# Patient Record
Sex: MALE | Race: WHITE | NOT HISPANIC OR LATINO | Employment: UNEMPLOYED | URBAN - METROPOLITAN AREA
[De-identification: names, ages, dates, MRNs, and addresses within clinical notes are randomized per-mention and may not be internally consistent; named-entity substitution may affect disease eponyms.]

---

## 2018-08-23 ENCOUNTER — HOSPITAL ENCOUNTER (EMERGENCY)
Facility: HOSPITAL | Age: 28
Discharge: HOME | End: 2018-08-23
Attending: FAMILY MEDICINE
Payer: MEDICAID

## 2018-08-23 VITALS
DIASTOLIC BLOOD PRESSURE: 88 MMHG | TEMPERATURE: 98 F | OXYGEN SATURATION: 98 % | SYSTOLIC BLOOD PRESSURE: 190 MMHG | WEIGHT: 176.69 LBS | HEIGHT: 72 IN | BODY MASS INDEX: 23.93 KG/M2 | RESPIRATION RATE: 20 BRPM | HEART RATE: 108 BPM

## 2018-08-23 DIAGNOSIS — M25.519 SHOULDER PAIN: ICD-10-CM

## 2018-08-23 DIAGNOSIS — I10 HYPERTENSION, UNSPECIFIED TYPE: ICD-10-CM

## 2018-08-23 DIAGNOSIS — S46.912A STRAIN OF LEFT SHOULDER, INITIAL ENCOUNTER: Primary | ICD-10-CM

## 2018-08-23 DIAGNOSIS — F17.200 SMOKER: ICD-10-CM

## 2018-08-23 PROCEDURE — 99283 EMERGENCY DEPT VISIT LOW MDM: CPT | Mod: 25

## 2018-08-23 RX ORDER — METHOCARBAMOL 500 MG/1
1000 TABLET, FILM COATED ORAL 3 TIMES DAILY
Qty: 30 TABLET | Refills: 0 | Status: SHIPPED | OUTPATIENT
Start: 2018-08-23 | End: 2018-08-28

## 2018-08-23 RX ORDER — PREDNISONE 20 MG/1
40 TABLET ORAL DAILY
Qty: 10 TABLET | Refills: 0 | Status: SHIPPED | OUTPATIENT
Start: 2018-08-23 | End: 2018-08-28

## 2018-08-23 NOTE — ED PROVIDER NOTES
Encounter Date: 8/23/2018       History     Chief Complaint   Patient presents with    Shoulder Injury     patient states he broke his left collar bone a few months ago and did Not seek treatment, patient states he re-injuried it couple days ago, left shoulder pain/swelling     The history is provided by the patient.   Arm Injury    The incident occurred two days ago. The incident occurred at home. The injury mechanism was a pulled limb. The injury was related to work (lifting a heavy object). There is an injury to the left shoulder. There have been prior injuries to these areas. Pertinent negatives include no chest pain, no altered mental status, no numbness, no visual disturbance, no abdominal pain, no bowel incontinence, no nausea, no vomiting, no bladder incontinence, no headaches, no hearing loss, no inability to bear weight, no neck pain, no pain when bearing weight, no focal weakness, no decreased responsiveness, no light-headedness, no loss of consciousness, no seizures, no tingling, no weakness, no cough, no difficulty breathing and no memory loss.     Review of patient's allergies indicates:   Allergen Reactions    Cyclobenzaprine Anaphylaxis    Amoxicillin     Ceclor [cefaclor]     Naproxen     Penicillins     Ultram [tramadol]      No past medical history on file.  Past Surgical History:   Procedure Laterality Date    LEG SURGERY      TONSILLECTOMY, ADENOIDECTOMY, BILATERAL MYRINGOTOMY AND TUBES       No family history on file.  Social History     Tobacco Use    Smoking status: Current Every Day Smoker     Packs/day: 0.50     Types: Cigarettes    Smokeless tobacco: Never Used   Substance Use Topics    Alcohol use: Not on file    Drug use: No     Review of Systems   Constitutional: Negative for decreased responsiveness, diaphoresis and fever.   HENT: Negative for hearing loss and sore throat.    Eyes: Negative for photophobia, redness and visual disturbance.   Respiratory: Negative for cough  and shortness of breath.    Cardiovascular: Negative for chest pain.   Gastrointestinal: Negative for abdominal pain, bowel incontinence, constipation, diarrhea, nausea and vomiting.   Endocrine: Negative for polydipsia and polyphagia.   Genitourinary: Negative for bladder incontinence, dysuria and frequency.   Musculoskeletal: Negative for back pain and neck pain.        Left shoulder pain   Skin: Negative for rash.   Neurological: Negative for tingling, focal weakness, seizures, loss of consciousness, weakness, light-headedness, numbness and headaches.   Hematological: Does not bruise/bleed easily.   Psychiatric/Behavioral: Negative for memory loss. The patient is not nervous/anxious.    All other systems reviewed and are negative.      Physical Exam     Initial Vitals [08/23/18 0944]   BP Pulse Resp Temp SpO2   (!) 190/88 108 20 98 °F (36.7 °C) 98 %      MAP       --         Physical Exam    Nursing note and vitals reviewed.  Constitutional: He appears well-developed and well-nourished.   HENT:   Head: Normocephalic and atraumatic.   Right Ear: External ear normal.   Left Ear: External ear normal.   Nose: Nose normal.   Mouth/Throat: Oropharynx is clear and moist.   Eyes: Conjunctivae and EOM are normal. Pupils are equal, round, and reactive to light.   Neck: Normal range of motion. Neck supple.   Cardiovascular: Normal rate, regular rhythm, normal heart sounds and intact distal pulses.   Pulmonary/Chest: Breath sounds normal. No respiratory distress. He has no wheezes. He has no rhonchi. He has no rales.   Abdominal: Soft. Bowel sounds are normal. He exhibits no distension. There is no tenderness. There is no rebound and no guarding.   Musculoskeletal:        Left shoulder: He exhibits decreased range of motion, tenderness, bony tenderness, swelling, deformity and pain. He exhibits no effusion, no crepitus, no laceration, no spasm, normal pulse and normal strength.   Healed left lateral clavicular fracture noted    Neurological: He is alert and oriented to person, place, and time. He has normal strength.   Skin: Skin is warm and dry.   Psychiatric: He has a normal mood and affect. His behavior is normal. Judgment and thought content normal.         ED Course   Procedures  Labs Reviewed - No data to display       Imaging Results          X-Ray Shoulder Trauma Left (Final result)  Result time 08/23/18 10:47:08    Final result by CAROL Traylor Sr., MD (08/23/18 10:47:08)                 Impression:      1. There has been interval healing of the fractures in the midportion of the left clavicle.  The medial side of the healed fracture is 32 mm higher than the lateral side of the healed fracture.  2. There is a healed fracture in the posterior aspect of the left 4th rib.      Electronically signed by: David Traylor MD  Date:    08/23/2018  Time:    10:47             Narrative:    EXAMINATION:  XR SHOULDER TRAUMA 3 VIEW LEFT    CLINICAL HISTORY:  Pain in unspecified shoulder    COMPARISON:  12/12/2017    FINDINGS:  There has been interval healing of the fractures in the midportion of the left clavicle.  The medial side of the healed fracture is 32 mm higher than the lateral side of the healed fracture.  There is a healed fracture in the posterior aspect of the left 4th rib.  There is no dislocation.                                                      Clinical Impression:   The primary encounter diagnosis was Strain of left shoulder, initial encounter. Diagnoses of Shoulder pain, Hypertension, unspecified type, and Smoker were also pertinent to this visit.      Disposition:   Disposition: Discharged  Condition: Stable                        BHARATI Powell  08/23/18 [id number]